# Patient Record
Sex: MALE | Race: WHITE | HISPANIC OR LATINO | URBAN - METROPOLITAN AREA
[De-identification: names, ages, dates, MRNs, and addresses within clinical notes are randomized per-mention and may not be internally consistent; named-entity substitution may affect disease eponyms.]

---

## 2017-02-01 ENCOUNTER — EMERGENCY (EMERGENCY)
Facility: HOSPITAL | Age: 27
LOS: 1 days | Discharge: HOME | End: 2017-02-01
Attending: EMERGENCY MEDICINE | Admitting: EMERGENCY MEDICINE
Payer: COMMERCIAL

## 2017-02-01 VITALS
TEMPERATURE: 98 F | HEART RATE: 114 BPM | OXYGEN SATURATION: 98 % | SYSTOLIC BLOOD PRESSURE: 118 MMHG | DIASTOLIC BLOOD PRESSURE: 84 MMHG | RESPIRATION RATE: 16 BRPM

## 2017-02-01 DIAGNOSIS — S61.251A OPEN BITE OF LEFT INDEX FINGER WITHOUT DAMAGE TO NAIL, INITIAL ENCOUNTER: ICD-10-CM

## 2017-02-01 DIAGNOSIS — S81.051A OPEN BITE, RIGHT KNEE, INITIAL ENCOUNTER: ICD-10-CM
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
## 2017-02-01 PROBLEM — Z00.00 ENCOUNTER FOR PREVENTIVE HEALTH EXAMINATION: Status: ACTIVE | Noted: 2017-02-01

## 2017-02-01 PROCEDURE — 99283 EMERGENCY DEPT VISIT LOW MDM: CPT

## 2017-02-01 PROCEDURE — 73140 X-RAY EXAM OF FINGER(S): CPT

## 2017-02-01 PROCEDURE — 73140 X-RAY EXAM OF FINGER(S): CPT | Mod: 26,LT

## 2017-02-01 PROCEDURE — 99283 EMERGENCY DEPT VISIT LOW MDM: CPT | Mod: 25

## 2017-02-01 RX ORDER — IBUPROFEN 200 MG
600 TABLET ORAL ONCE
Qty: 0 | Refills: 0 | Status: COMPLETED | OUTPATIENT
Start: 2017-02-01 | End: 2017-02-01

## 2017-02-01 RX ADMIN — Medication 1 TABLET(S): at 19:06

## 2017-02-01 RX ADMIN — Medication 600 MILLIGRAM(S): at 19:06

## 2017-02-01 NOTE — ED PROVIDER NOTE - PHYSICAL EXAMINATION
few mm superficial lac to dorsal left index, 5/5 ext, no tendon seen  few superficial lacs to right ant knee.

## 2017-02-01 NOTE — ED PROVIDER NOTE - OBJECTIVE STATEMENT
25 yo male presents to the ER for evaluation of dog bite to right le and left 2nd digit. Pt states "My two pitbulls have not been getting along well and got into a scuffle and I tried to break it up and they bit me". Tdap utd. dog's immunizations utd as well.  Pt reports mild pain to bite wounds.  Minimal bleeding to site.

## 2017-02-01 NOTE — ED PROVIDER NOTE - PLAN OF CARE
Wash wounds daily with soap and water, apply bacitracin to wounds at home and cover with sry sterile dressing. REturn to the ER for any concerns. Take motrin for pain

## 2017-02-01 NOTE — ED ADULT NURSE NOTE - OBJECTIVE STATEMENT
26 yr old male present to the Er for s/p dog bite. Pt reports that he was breaking up a dog fight when the dog turned on him and bit him. Pt's TDAP status up to date and his dogs are fully immunized. Pt sustained puncture  wounds to the left 2 nd  digit of left hand and right thigh.

## 2017-02-01 NOTE — ED PROVIDER NOTE - ATTENDING CONTRIBUTION TO CARE
. .dog bites, small superficial.  no tendon injury.  xray neg.  tdap.  augmentin for prophylaxis x 5d.

## 2017-02-01 NOTE — ED PROVIDER NOTE - CARE PLAN
Principal Discharge DX:	Dog bite, initial encounter  Instructions for follow-up, activity and diet:	Wash wounds daily with soap and water, apply bacitracin to wounds at home and cover with sry sterile dressing. REturn to the ER for any concerns. Take motrin for pain

## 2022-10-02 ENCOUNTER — EMERGENCY (EMERGENCY)
Facility: HOSPITAL | Age: 32
LOS: 1 days | Discharge: HOME | End: 2022-10-02
Attending: STUDENT IN AN ORGANIZED HEALTH CARE EDUCATION/TRAINING PROGRAM
Payer: COMMERCIAL

## 2022-10-02 VITALS
TEMPERATURE: 98 F | DIASTOLIC BLOOD PRESSURE: 82 MMHG | OXYGEN SATURATION: 100 % | RESPIRATION RATE: 18 BRPM | WEIGHT: 139.99 LBS | HEART RATE: 67 BPM | SYSTOLIC BLOOD PRESSURE: 137 MMHG | HEIGHT: 67 IN

## 2022-10-02 PROCEDURE — 99285 EMERGENCY DEPT VISIT HI MDM: CPT

## 2022-10-02 PROCEDURE — 93010 ELECTROCARDIOGRAM REPORT: CPT | Mod: NC

## 2022-10-02 NOTE — ED ADULT NURSE NOTE - OBJECTIVE STATEMENT
32y Male presents to the ED c/o CP. Pt states the chest pain began 32y Male presents to the ED c/o CP. Pt states he has chest pain x1 week, Pain worsened tonight around 10:30 PM. Pain is on the left side of chest, radiating to left shoulder. Pt states the pain is shooting in his left shoulder, and his chest feels tight. Pt endorses chills, and head pressure. Pt denies SOB, dizziness, numbness, and tingling. pt states he took his BP at home and it was 140/109. Pt has an appointment with a new PCP this upcoming week. Pt is A&Ox4, and ambulatory. Respirations spontaneous, unlabored, and equal bilaterally. Patient safety maintained, bed is in lowest position, wheels locked, and side rails raised.

## 2022-10-03 VITALS
TEMPERATURE: 98 F | OXYGEN SATURATION: 99 % | HEART RATE: 55 BPM | RESPIRATION RATE: 17 BRPM | SYSTOLIC BLOOD PRESSURE: 115 MMHG | DIASTOLIC BLOOD PRESSURE: 78 MMHG

## 2022-10-03 LAB
ALBUMIN SERPL ELPH-MCNC: 4.5 G/DL — SIGNIFICANT CHANGE UP (ref 3.3–5)
ALP SERPL-CCNC: 63 U/L — SIGNIFICANT CHANGE UP (ref 40–120)
ALT FLD-CCNC: 15 U/L — SIGNIFICANT CHANGE UP (ref 10–45)
ANION GAP SERPL CALC-SCNC: 10 MMOL/L — SIGNIFICANT CHANGE UP (ref 5–17)
AST SERPL-CCNC: 19 U/L — SIGNIFICANT CHANGE UP (ref 10–40)
BASOPHILS # BLD AUTO: 0.02 K/UL — SIGNIFICANT CHANGE UP (ref 0–0.2)
BASOPHILS NFR BLD AUTO: 0.3 % — SIGNIFICANT CHANGE UP (ref 0–2)
BILIRUB SERPL-MCNC: 0.3 MG/DL — SIGNIFICANT CHANGE UP (ref 0.2–1.2)
BUN SERPL-MCNC: 14 MG/DL — SIGNIFICANT CHANGE UP (ref 7–23)
CALCIUM SERPL-MCNC: 10 MG/DL — SIGNIFICANT CHANGE UP (ref 8.4–10.5)
CHLORIDE SERPL-SCNC: 105 MMOL/L — SIGNIFICANT CHANGE UP (ref 96–108)
CO2 SERPL-SCNC: 27 MMOL/L — SIGNIFICANT CHANGE UP (ref 22–31)
CREAT SERPL-MCNC: 0.98 MG/DL — SIGNIFICANT CHANGE UP (ref 0.5–1.3)
EGFR: 106 ML/MIN/1.73M2 — SIGNIFICANT CHANGE UP
EOSINOPHIL # BLD AUTO: 0.14 K/UL — SIGNIFICANT CHANGE UP (ref 0–0.5)
EOSINOPHIL NFR BLD AUTO: 1.8 % — SIGNIFICANT CHANGE UP (ref 0–6)
FLUAV AG NPH QL: SIGNIFICANT CHANGE UP
FLUBV AG NPH QL: SIGNIFICANT CHANGE UP
GLUCOSE SERPL-MCNC: 105 MG/DL — HIGH (ref 70–99)
HCT VFR BLD CALC: 44.3 % — SIGNIFICANT CHANGE UP (ref 39–50)
HGB BLD-MCNC: 14.9 G/DL — SIGNIFICANT CHANGE UP (ref 13–17)
IMM GRANULOCYTES NFR BLD AUTO: 0.3 % — SIGNIFICANT CHANGE UP (ref 0–0.9)
LYMPHOCYTES # BLD AUTO: 2.85 K/UL — SIGNIFICANT CHANGE UP (ref 1–3.3)
LYMPHOCYTES # BLD AUTO: 36 % — SIGNIFICANT CHANGE UP (ref 13–44)
MCHC RBC-ENTMCNC: 30.7 PG — SIGNIFICANT CHANGE UP (ref 27–34)
MCHC RBC-ENTMCNC: 33.6 GM/DL — SIGNIFICANT CHANGE UP (ref 32–36)
MCV RBC AUTO: 91.2 FL — SIGNIFICANT CHANGE UP (ref 80–100)
MONOCYTES # BLD AUTO: 0.84 K/UL — SIGNIFICANT CHANGE UP (ref 0–0.9)
MONOCYTES NFR BLD AUTO: 10.6 % — SIGNIFICANT CHANGE UP (ref 2–14)
NEUTROPHILS # BLD AUTO: 4.05 K/UL — SIGNIFICANT CHANGE UP (ref 1.8–7.4)
NEUTROPHILS NFR BLD AUTO: 51 % — SIGNIFICANT CHANGE UP (ref 43–77)
NRBC # BLD: 0 /100 WBCS — SIGNIFICANT CHANGE UP (ref 0–0)
PLATELET # BLD AUTO: 244 K/UL — SIGNIFICANT CHANGE UP (ref 150–400)
POTASSIUM SERPL-MCNC: 4.3 MMOL/L — SIGNIFICANT CHANGE UP (ref 3.5–5.3)
POTASSIUM SERPL-SCNC: 4.3 MMOL/L — SIGNIFICANT CHANGE UP (ref 3.5–5.3)
PROT SERPL-MCNC: 7.3 G/DL — SIGNIFICANT CHANGE UP (ref 6–8.3)
RBC # BLD: 4.86 M/UL — SIGNIFICANT CHANGE UP (ref 4.2–5.8)
RBC # FLD: 11.9 % — SIGNIFICANT CHANGE UP (ref 10.3–14.5)
RSV RNA NPH QL NAA+NON-PROBE: SIGNIFICANT CHANGE UP
SARS-COV-2 RNA SPEC QL NAA+PROBE: SIGNIFICANT CHANGE UP
SODIUM SERPL-SCNC: 142 MMOL/L — SIGNIFICANT CHANGE UP (ref 135–145)
TROPONIN T, HIGH SENSITIVITY RESULT: <6 NG/L — SIGNIFICANT CHANGE UP (ref 0–51)
WBC # BLD: 7.92 K/UL — SIGNIFICANT CHANGE UP (ref 3.8–10.5)
WBC # FLD AUTO: 7.92 K/UL — SIGNIFICANT CHANGE UP (ref 3.8–10.5)

## 2022-10-03 PROCEDURE — 84484 ASSAY OF TROPONIN QUANT: CPT

## 2022-10-03 PROCEDURE — 71046 X-RAY EXAM CHEST 2 VIEWS: CPT | Mod: 26

## 2022-10-03 PROCEDURE — 71046 X-RAY EXAM CHEST 2 VIEWS: CPT

## 2022-10-03 PROCEDURE — 87637 SARSCOV2&INF A&B&RSV AMP PRB: CPT

## 2022-10-03 PROCEDURE — 93005 ELECTROCARDIOGRAM TRACING: CPT

## 2022-10-03 PROCEDURE — 85025 COMPLETE CBC W/AUTO DIFF WBC: CPT

## 2022-10-03 PROCEDURE — 80053 COMPREHEN METABOLIC PANEL: CPT

## 2022-10-03 PROCEDURE — 99285 EMERGENCY DEPT VISIT HI MDM: CPT | Mod: 25

## 2022-10-03 NOTE — ED PROVIDER NOTE - NS ED ROS FT
REVIEW OF SYSTEMS:    CONSTITUTIONAL: No fever, weight loss, + fatigue  EYES: No eye pain, visual disturbances, or discharge  ENMT:  No difficulty hearing, tinnitus, vertigo; No sinus or throat pain  NECK: No pain or stiffness  RESPIRATORY: No cough, wheezing, +chills or hemoptysis; + shortness of breath  CARDIOVASCULAR: No chest pain, palpitations, dizziness, or leg swelling  GASTROINTESTINAL: + abdominal or epigastric pain. No nausea, vomiting, or hematemesis; + diarrhea, no constipation. No melena or hematochezia.  GENITOURINARY: No dysuria, frequency, hematuria, or incontinence  NEUROLOGICAL: No headaches, memory loss, loss of strength, numbness, or tremors  SKIN: No itching, burning, rashes, or lesions

## 2022-10-03 NOTE — ED PROVIDER NOTE - PHYSICAL EXAMINATION
GENERAL: well appearing in no acute distress, non-toxic appearing  HEAD: normocephalic, atraumatic  HEENT: normal conjunctiva, oral mucosa moist, uvula midline, no tonsilar exudates, neck supple, no JVD  CARDIAC: regular rate and rhythm, normal S1S2, no appreciable murmurs, 2+ pulses in UE/LE b/l  PULM: normal breath sounds, clear to ascultation bilaterally, no rales, rhonchi, wheezing  GI: abdomen nondistended, soft, nontender, no guarding, rebound tenderness  : no CVA tenderness b/l, no suprapubic tenderness  NEURO: no focal motor or sensory deficits, CN2-12 intact, normal speech, PERRL, EOMI, normal gait, AAOx3  MSK: nno TTP to chest, no peripheral edema, no calf tenderness b/l  SKIN: well-perfused, extremities warm, no visible rashes  PSYCH: appropriate mood and affect

## 2022-10-03 NOTE — ED PROVIDER NOTE - NSFOLLOWUPINSTRUCTIONS_ED_ALL_ED_FT
Your were evaluated in the ED today for chest pain. Your results were discussed with you.    Chest pain can be caused by many different conditions which may or may not be dangerous. Causes include heartburn, lung infections, heart attack, blood clot in lungs, skin infections, strain or damage to muscle, cartilage, or bones, etc. In addition to a history and physical examination, an electrocardiogram (ECG) and lab tests were performed to determine the cause of your chest pain. Follow up with your primary care provider or with a cardiologist as instructed.     Drink plenty of fluids.  Advance activity as tolerated. Follow up with your primary care physician in 48-72 hours- bring copies of your results.  Return to the ER for worsening or persistent symptoms, and/or ANY NEW OR CONCERNING SYMPTOMS. If you have issues obtaining follow up, please call: [phone] to obtain a doctor or specialist who takes your insurance in your area.      SEEK IMMEDIATE MEDICAL CARE IF YOU HAVE ANY OF THE FOLLOWING SYMPTOMS: worsening chest pain, coughing up blood, unexplained back/neck/jaw pain, severe abdominal pain, dizziness or lightheadedness, fainting, shortness of breath, sweaty or clammy skin, vomiting, or racing heart beat. These symptoms may represent a serious problem that is an emergency. Do not wait to see if the symptoms will go away. Get medical help right away. Call [phone] and do not drive yourself to the hospital.

## 2022-10-03 NOTE — ED PROVIDER NOTE - CLINICAL SUMMARY MEDICAL DECISION MAKING FREE TEXT BOX
32y no PMH w/ intermittent shocks of stinging chest pain since 1 week, unlikely cardiac however will r/o ACS, exam unremarkable. Labs, trop, CXR. Dispo home w/PCP f/u. {t already has appt tomorrow.

## 2022-10-03 NOTE — ED PROVIDER NOTE - PATIENT PORTAL LINK FT
You can access the FollowMyHealth Patient Portal offered by [facility] by registering at the following website: [email or web address]. By joining [employer]’s FollowMyHealth portal, you will also be able to view your health information using other applications (apps) compatible with our system.

## 2022-10-03 NOTE — ED PROVIDER NOTE - OBJECTIVE STATEMENT
32yM no PMH presents with 1 week of intermittent "shocks/stinging" chest pain that lasts 2-3 seconds at a time to his lateral left chest. Has been increasing in frequency past 2 days. He also endorses cold sweats and L arm tingling down to his fingers but no numbness. Endorses fatigue, chills, sob at rest, diarrhea. No tobacco use, recreational alcohol use, no recent travel but does have sick contacts at work. He is a physical therapist.     Pt already has appt with PCP tomorrow.

## 2022-10-03 NOTE — ED PROVIDER NOTE - ATTENDING CONTRIBUTION TO CARE
I, Prakash Harris, performed a history and physical exam of the patient and discussed their management with the resident and/or advanced care provider. I reviewed the resident and/or advanced care provider's note and agree with the documented findings and plan of care. I was present and available for all procedures.    32yM no PMH presents with 1 week of intermittent "shocks/stinging" chest pain that lasts 2-3 seconds at a time to his lateral left chest. Has been increasing in frequency past 2 days. He also endorses cold sweats and L arm tingling down to his fingers but no numbness. Endorses fatigue, chills, sob at rest, diarrhea. No tobacco use, recreational alcohol use, no recent travel but does have sick contacts at work. 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    Well appearing and in NAD, head normal appearing atraumatic, trachea midline, no respiratory distress, lungs cta bilaterally, rrr no murmurs, soft NT ND abdomen, no visible extremity deformities, Alert and oriented, non focal neuro exam, skin warm and dry, normal affect and mood   No leg swelling or JVD    pw chest pain, ekg. pt on monitor, enzymes, aspirin, cxr for pneumothorax or infiltrates, less likely PE with low risk on wells and PERC neg, unlikely dissection will disposition based on heart score

## 2023-03-02 ENCOUNTER — EMERGENCY (EMERGENCY)
Facility: HOSPITAL | Age: 33
LOS: 1 days | Discharge: HOME | End: 2023-03-02
Attending: EMERGENCY MEDICINE
Payer: COMMERCIAL

## 2023-03-02 VITALS
HEART RATE: 74 BPM | OXYGEN SATURATION: 100 % | HEIGHT: 67 IN | SYSTOLIC BLOOD PRESSURE: 121 MMHG | DIASTOLIC BLOOD PRESSURE: 73 MMHG | TEMPERATURE: 98 F | WEIGHT: 149.91 LBS | RESPIRATION RATE: 19 BRPM

## 2023-03-02 LAB
ALBUMIN SERPL ELPH-MCNC: 4.8 G/DL — SIGNIFICANT CHANGE UP (ref 3.3–5)
ALP SERPL-CCNC: 57 U/L — SIGNIFICANT CHANGE UP (ref 40–120)
ALT FLD-CCNC: 16 U/L — SIGNIFICANT CHANGE UP (ref 10–45)
ANION GAP SERPL CALC-SCNC: 12 MMOL/L — SIGNIFICANT CHANGE UP (ref 5–17)
APPEARANCE UR: CLEAR — SIGNIFICANT CHANGE UP
AST SERPL-CCNC: 25 U/L — SIGNIFICANT CHANGE UP (ref 10–40)
BILIRUB SERPL-MCNC: 0.6 MG/DL — SIGNIFICANT CHANGE UP (ref 0.2–1.2)
BILIRUB UR-MCNC: NEGATIVE — SIGNIFICANT CHANGE UP
BUN SERPL-MCNC: 22 MG/DL — SIGNIFICANT CHANGE UP (ref 7–23)
CALCIUM SERPL-MCNC: 10 MG/DL — SIGNIFICANT CHANGE UP (ref 8.4–10.5)
CHLORIDE SERPL-SCNC: 100 MMOL/L — SIGNIFICANT CHANGE UP (ref 96–108)
CO2 SERPL-SCNC: 25 MMOL/L — SIGNIFICANT CHANGE UP (ref 22–31)
COLOR SPEC: SIGNIFICANT CHANGE UP
CREAT SERPL-MCNC: 1.53 MG/DL — HIGH (ref 0.5–1.3)
DIFF PNL FLD: NEGATIVE — SIGNIFICANT CHANGE UP
EGFR: 62 ML/MIN/1.73M2 — SIGNIFICANT CHANGE UP
GLUCOSE SERPL-MCNC: 105 MG/DL — HIGH (ref 70–99)
GLUCOSE UR QL: NEGATIVE — SIGNIFICANT CHANGE UP
HCT VFR BLD CALC: 47.4 % — SIGNIFICANT CHANGE UP (ref 39–50)
HGB BLD-MCNC: 15.8 G/DL — SIGNIFICANT CHANGE UP (ref 13–17)
KETONES UR-MCNC: ABNORMAL
LEUKOCYTE ESTERASE UR-ACNC: NEGATIVE — SIGNIFICANT CHANGE UP
MCHC RBC-ENTMCNC: 29.9 PG — SIGNIFICANT CHANGE UP (ref 27–34)
MCHC RBC-ENTMCNC: 33.3 GM/DL — SIGNIFICANT CHANGE UP (ref 32–36)
MCV RBC AUTO: 89.8 FL — SIGNIFICANT CHANGE UP (ref 80–100)
NITRITE UR-MCNC: NEGATIVE — SIGNIFICANT CHANGE UP
NRBC # BLD: 0 /100 WBCS — SIGNIFICANT CHANGE UP (ref 0–0)
PH UR: 6.5 — SIGNIFICANT CHANGE UP (ref 5–8)
PLATELET # BLD AUTO: 264 K/UL — SIGNIFICANT CHANGE UP (ref 150–400)
POTASSIUM SERPL-MCNC: 4.4 MMOL/L — SIGNIFICANT CHANGE UP (ref 3.5–5.3)
POTASSIUM SERPL-SCNC: 4.4 MMOL/L — SIGNIFICANT CHANGE UP (ref 3.5–5.3)
PROT SERPL-MCNC: 8.2 G/DL — SIGNIFICANT CHANGE UP (ref 6–8.3)
PROT UR-MCNC: SIGNIFICANT CHANGE UP
RBC # BLD: 5.28 M/UL — SIGNIFICANT CHANGE UP (ref 4.2–5.8)
RBC # FLD: 12.1 % — SIGNIFICANT CHANGE UP (ref 10.3–14.5)
SODIUM SERPL-SCNC: 137 MMOL/L — SIGNIFICANT CHANGE UP (ref 135–145)
SP GR SPEC: 1.02 — SIGNIFICANT CHANGE UP (ref 1.01–1.02)
UROBILINOGEN FLD QL: NEGATIVE — SIGNIFICANT CHANGE UP
WBC # BLD: 9.85 K/UL — SIGNIFICANT CHANGE UP (ref 3.8–10.5)
WBC # FLD AUTO: 9.85 K/UL — SIGNIFICANT CHANGE UP (ref 3.8–10.5)

## 2023-03-02 PROCEDURE — 76870 US EXAM SCROTUM: CPT | Mod: 26

## 2023-03-02 PROCEDURE — 99284 EMERGENCY DEPT VISIT MOD MDM: CPT

## 2023-03-02 PROCEDURE — 93975 VASCULAR STUDY: CPT | Mod: 26

## 2023-03-02 RX ORDER — SODIUM CHLORIDE 9 MG/ML
1000 INJECTION INTRAMUSCULAR; INTRAVENOUS; SUBCUTANEOUS ONCE
Refills: 0 | Status: COMPLETED | OUTPATIENT
Start: 2023-03-02 | End: 2023-03-02

## 2023-03-02 RX ORDER — ACETAMINOPHEN 500 MG
650 TABLET ORAL ONCE
Refills: 0 | Status: COMPLETED | OUTPATIENT
Start: 2023-03-02 | End: 2023-03-02

## 2023-03-02 RX ORDER — IBUPROFEN 200 MG
400 TABLET ORAL ONCE
Refills: 0 | Status: COMPLETED | OUTPATIENT
Start: 2023-03-02 | End: 2023-03-02

## 2023-03-02 RX ADMIN — Medication 400 MILLIGRAM(S): at 21:30

## 2023-03-02 RX ADMIN — Medication 650 MILLIGRAM(S): at 21:30

## 2023-03-02 NOTE — ED PROVIDER NOTE - PATIENT PORTAL LINK FT
You can access the FollowMyHealth Patient Portal offered by [facility] by registering at the following website: [email or web address]. By joining [employer]’s FollowMyHealth portal, you will also be able to view your health information using other applications (apps) compatible with our system.

## 2023-03-02 NOTE — ED ADULT NURSE NOTE - NS ED NURSE RECORD ANOTHER VITAL SIGN
Concern for cardiopulmonary deterioration/Concern for worsening infectious process/Concern for delay in diagnosis and treatment/Other:
Yes

## 2023-03-02 NOTE — ED ADULT TRIAGE NOTE - CHIEF COMPLAINT QUOTE
C/o R testicular pain x 1 month that worsened over 24hrs. Endorses lower abdominal pain. Denies  symptoms,

## 2023-03-02 NOTE — ED PROVIDER NOTE - CLINICAL SUMMARY MEDICAL DECISION MAKING FREE TEXT BOX
32-year-old male presents with  testicular pain.  Afebrile not tachycardic blood pressure normal.  On exam mild scrotal edema and erythema however no tenderness with scrotal palpation no hernia felt on exam no penile lesions noted.    Differentials include orchitis, epididymitis, varicoceles,  STI   less likely testicular torsion given no tenderness on exam and symptoms ongoing for multiple weeks   plan to evaluate with labs, STI testing, testicular ultrasound

## 2023-03-02 NOTE — ED PROVIDER NOTE - NSFOLLOWUPINSTRUCTIONS_ED_ALL_ED_FT
To control your pain at home, you should take Ibuprofen 400 mg along with Tylenol 650mg-1000mg every 6 to 8 hours. Limit your maximum daily Tylenol from all sources to 4000mg. Be aware that many other medications contain acetaminophen which is also known as Tylenol. Taking Tylenol and Ibuprofen together has been shown to be more effective at relieving pain than taking them separately. These are both over the counter medications that you can  at your local pharmacy without a prescription. You need to respect all of the warnings on the bottles. You shouldn’t take these medications for more than a week without following up with your doctor. Both medications come with certain risks and side effects that you need to discuss with your doctor, especially if you are taking them for a prolonged period.       Varicocele       A varicocele is a swelling of veins in the scrotum. The scrotum is the sac that contains the testicles. Varicoceles can occur on either side of the scrotum, but they are more common on the left side. They occur most often in teenage boys and young men.    In most cases, varicoceles are not a serious problem. They are usually small and painless and do not require treatment. Tests may be done to confirm the diagnosis. Treatment may be needed if:  •A varicocele is large, causes a lot of pain, or causes pain when exercising.      •Varicoceles are found on both sides of the scrotum.      •A varicocele causes a decrease in the size of the testicle in a growing adolescent.      •The person has fertility problems.        What are the causes?    This condition is the result of valves in the veins not working properly. Valves in the veins help to return blood from the scrotum and testicles to the heart. If these valves do not work well, blood flows backward and backs up into the veins, which causes the veins to swell. This is similar to what happens when varicose veins form in the leg.      What are the signs or symptoms?    Most varicoceles do not cause any symptoms. If symptoms do occur, they may include:  •Swelling on one side of the scrotum. The swelling may be more obvious when you are standing up.      •A lumpy feeling in the scrotum.      •A heavy feeling on one side of the scrotum.      •A dull ache in the scrotum, especially after exercise or prolonged standing or sitting.      •Slower growth or reduced size of the testicle on the side of the varicocele (in young males).      •Problems with fathering a child (fertility). This can occur if the testicle does not grow normally or if the condition causes problems with the sperm, such as a low sperm count or sperm that are not able to reach the egg (poor motility).        How is this diagnosed?    This condition is diagnosed based on:  •Your medical history.      •A physical exam. Your health care provider may inspect and feel (palpate) the scrotal area to check for swollen or enlarged veins.      •An ultrasound. This may be done to confirm the diagnosis and to help rule out other causes of the swelling.        How is this treated?    Treatment is usually not needed for this condition. If you have any pain, your health care provider may prescribe or recommend medicine to help relieve it. You may need regular exams so your health care provider can monitor the varicocele to ensure that it does not cause problems.    When further treatment is needed, it may involve one of these options:  •Varicocelectomy. This is a surgery in which the swollen veins are tied off so that the flow of blood goes to other veins instead.      •Embolization. In this procedure, a small, thin tube (catheter) is used to place metal coils or other blocking items in the veins. This cuts off the blood flow to the swollen veins.        Follow these instructions at home:    •Take over-the-counter and prescription medicines only as told by your health care provider.      •Wear supportive underwear.      •Use an athletic supporter when participating in sports activities.      •Keep all follow-up visits as told by your health care provider. This is important.        Contact a health care provider if:    •Your pain is increasing.      •You have redness in the affected area.      •Your testicle becomes enlarged, swollen, or painful.      •You have swelling that does not decrease when you are lying down.      •One of your testicles is smaller than the other.        Get help right away if:    •You develop swelling in your legs.      •You have difficulty breathing.        Summary    •Varicocele is a condition in which the veins in the scrotum are swollen or enlarged.      •In most cases, varicoceles do not require treatment.      •Treatment may be needed if you have pain, have problems with infertility, or have a smaller testicle associated with the varicocele.      •In some cases, the condition may be treated with a procedure to cut off the flow of blood to the swollen veins.      This information is not intended to replace advice given to you by your health care provider. Make sure you discuss any questions you have with your health care provider.

## 2023-03-02 NOTE — ED PROVIDER NOTE - OBJECTIVE STATEMENT
32-year-old male presents with complaints of testicular pain.  Patient states he has been having on and off testicular pain for weeks however over the past 2 weeks have worsened.  Pain in his bilateral testicles worsens with sitting.  endorsing swelling and redness towards the testicles.  Patient has had outpatient STD and abdominal ultrasound which was negative, report provided to me.  also endorsing mild dysuria however no discharge or penile lesions. history of syphilis which was treated with penicillin IM as per patient  pt denies any fever, cp, palpitations, sob, abdominal pain, v/d,  hematuria, numbness    sexually active with multiple partners using barrier protection

## 2023-03-02 NOTE — ED PROVIDER NOTE - RAPID ASSESSMENT
Testicular pain x several weeks,  Feels a hard nodule on the R testicle.  Worse when sitting.  Similar episode several months ago that had been improving but is now worsening x 2 wk.  Already tested for STI (reported negative) and Abd US (reported negative).  Not yet seen by urology.  Multiple partners (women).  no penile d/c or dysuria.  Had been treated for syphilis 1 yr ago.      *** I, Anurag Robb MD., in the role of "Quick Triage Doctor (QDOC)," performed an initial, limited, rapid assessment of this patient, through either telemedicine or face to face encounter.  Based on history of present illness and general appearance, I determined that the patient should be evaluated in the main ED and initiated a preliminary workup to expedite the patient's course in the emergency department; this patient's evaluation is not complete and only preliminary.     The full assessment, management, and reassessment of this patient, including but not limited to the follow up of ordered laboratory and radiologic testing, and any/all other patient care impacting decisions is deferred to the receiving primary emergency department team.  I was not involved in this patient's care after the QDOC process, and unless otherwise noted in the ED provider note, was not involved in their care during their ED course *** Testicular pain x several weeks,  Feels a hard nodule on the R testicle.  Worse when sitting.  Similar episode several months ago that had been improving but is now worsening x 2 wk.  Already tested for STI (reported negative) and Abd US (reported negative).  Not yet seen by urology.  Multiple partners (women).  no penile d/c or dysuria.  Had been treated for syphilis 1 yr ago.  Of note, states that when pain started at the beginning of the year it was primarily in his perineum, now more testicular.  +testicular pain with ejaculation but no hemospermia.      *** I, Anurag Robb MD., in the role of "Quick Triage Doctor (QDOC)," performed an initial, limited, rapid assessment of this patient, through either telemedicine or face to face encounter.  Based on history of present illness and general appearance, I determined that the patient should be evaluated in the main ED and initiated a preliminary workup to expedite the patient's course in the emergency department; this patient's evaluation is not complete and only preliminary.     The full assessment, management, and reassessment of this patient, including but not limited to the follow up of ordered laboratory and radiologic testing, and any/all other patient care impacting decisions is deferred to the receiving primary emergency department team.  I was not involved in this patient's care after the QDOC process, and unless otherwise noted in the ED provider note, was not involved in their care during their ED course ***

## 2023-03-02 NOTE — ED PROVIDER NOTE - PROGRESS NOTE DETAILS
Endorsed to Dr Neha Cristobal MD, Facep [facility] PGY-3  patient found to have bilateral varicoceles.  will escalate to CT imaging to rule out any IVC thrombosis [facility] PGY-3 CT showing heterogenous prostate UA negative for any bacteria symptoms have been ongoing for multiple weeks patient afebrile no elevated white count will await for culture if positive will treat for prostatitis.    Patient noted to have bilateral varicoceles will provide follow-up for urology   given strict return precautions for any worsening swelling, pain to please come back to the ER. [facility] PGY-3 CT showing heterogenous prostate UA negative for any bacteria symptoms have been ongoing for multiple weeks patient afebrile no elevated white count will await for culture if positive will treat for prostatitis.  pt found to have elevated cr, states he takes large amount of creatnine otc and ashwaganda root. his outpatient bloodwork shows cr 1.0, recommended to d/c his OTC supplements and to follow up cr outpatient     Patient noted to have bilateral varicoceles will provide follow-up for urology   given strict return precautions for any worsening swelling, pain to please come back to the ER.

## 2023-03-02 NOTE — ED PROVIDER NOTE - PHYSICAL EXAMINATION
Vital signs reviewed  GENERAL: Patient nontoxic appearing, NAD  HEAD: NCAT  EYES: Anicteric  ENT: MMM  NECK: Supple, non tender  RESPIRATORY: Normal respiratory effort. CTA B/L. No wheezing, rales, rhonchi  CARDIOVASCULAR: Regular rate and rhythm  ABDOMEN: Soft. Nondistended. Nontender. No guarding or rebound. No CVA tenderness.  gu:  ROSA Raygoza no penile lesions, rash. no penile discharge noted on exam. mild scrotal  soft tissue edema and erythema no tenderness noted  MUSCULOSKELETAL/EXTREMITIES: Brisk cap refill. 2+ radial pulses. No leg edema.  SKIN:  Warm and dry  NEURO: AAOx3. No gross FND.  PSYCHIATRIC: Cooperative. Affect appropriate.

## 2023-03-02 NOTE — ED ADULT NURSE NOTE - OBJECTIVE STATEMENT
Pt is a 31 y/o male presenting to the ED c/o testicular pain intermittently x2 weeks associated with redness, swelling. Pt states pain is worsened when in sitting position. Pt has also had dysuria, denies hematuria, back pain, discharge at this time. PMH syphilis, Pt is a 33 y/o male presenting to the ED c/o testicular pain intermittently x2 weeks associated with redness, swelling. Pt states pain is worsened when in sitting position. Pt has also had dysuria, denies hematuria, back pain, discharge at this time. PMH syphilis. Pt is A&OX3, following commands, speaking coherently, sensory/motor function intact, NAD noted.

## 2023-03-02 NOTE — ED PROVIDER NOTE - ATTENDING CONTRIBUTION TO CARE
32y male pmh Syphyliss tx w pcn in past. Sexually active. Pt comes to ed c/o testicular pain rt>l onset 12/2022. Had neg acuna as opt with neg gc/chlamydia. NO fever chills, back pain, hematuria. Has mild dysuria, PE WDWN male awake alert normocephalic atraumatic neck supple chest clear abd soft +bs  uncircumcised, No scrotal swelling, +very mild erythema. No lesions/vesicles. Neruo grossly intact  Manuel Cristobal MD, Facep

## 2023-03-02 NOTE — ED PROVIDER NOTE - NSFOLLOWUPCLINICS_GEN_ALL_ED_FT
[facility] [place]  Urology  [address] [facility], [address] [address]  Phone: [phone]  Fax:     [facility]  Urology  [address] [place] [id number]  Phone: [phone]  Fax:     [facility]  Urology  [address] [place] [address]  Phone: [phone]  Fax: [phone]

## 2023-03-03 VITALS
RESPIRATION RATE: 16 BRPM | TEMPERATURE: 99 F | DIASTOLIC BLOOD PRESSURE: 71 MMHG | OXYGEN SATURATION: 100 % | SYSTOLIC BLOOD PRESSURE: 113 MMHG | HEART RATE: 78 BPM

## 2023-03-03 PROCEDURE — 93975 VASCULAR STUDY: CPT

## 2023-03-03 PROCEDURE — 85027 COMPLETE CBC AUTOMATED: CPT

## 2023-03-03 PROCEDURE — 80053 COMPREHEN METABOLIC PANEL: CPT

## 2023-03-03 PROCEDURE — 99285 EMERGENCY DEPT VISIT HI MDM: CPT | Mod: 25

## 2023-03-03 PROCEDURE — 87086 URINE CULTURE/COLONY COUNT: CPT

## 2023-03-03 PROCEDURE — 81003 URINALYSIS AUTO W/O SCOPE: CPT

## 2023-03-03 PROCEDURE — 76870 US EXAM SCROTUM: CPT

## 2023-03-03 PROCEDURE — 81001 URINALYSIS AUTO W/SCOPE: CPT

## 2023-03-03 PROCEDURE — 74177 CT ABD & PELVIS W/CONTRAST: CPT | Mod: 26,MA

## 2023-03-03 PROCEDURE — 36415 COLL VENOUS BLD VENIPUNCTURE: CPT

## 2023-03-03 PROCEDURE — 74177 CT ABD & PELVIS W/CONTRAST: CPT | Mod: MA

## 2023-03-03 RX ADMIN — SODIUM CHLORIDE 1000 MILLILITER(S): 9 INJECTION INTRAMUSCULAR; INTRAVENOUS; SUBCUTANEOUS at 00:01

## 2023-03-04 LAB
CULTURE RESULTS: SIGNIFICANT CHANGE UP
SPECIMEN SOURCE: SIGNIFICANT CHANGE UP